# Patient Record
(demographics unavailable — no encounter records)

---

## 2017-10-08 NOTE — RADRPT
PROCEDURE:   CTA Chest. 

 

CLINICAL INDICATION:   chest pain  

 

TECHNIQUE:   The study was performed utilizing a  multidetector CT scanner. Direct spiral 1 mm axial
 sections were obtained from the thoracic inlet to the upper abdomen with the use of 100 cc of Omnip
aque 350 nonionic intravenous contrast material and reformatted at 3 mm. Coronal, sagittal and 3-D a
ngiographic reformations were obtained. The images were reviewed on a PACS workstation. 

 

CT D I 49 mCi

 

Dose 338 mGy/cm

 

Individualized dose optimization technique was used for the performance of this exam.

 

This included

1. Automated exposure control.

2. Adjustment of the mA and / or kV according to the patient's size.

3. Use of  iterative reconstruction technique.

 

COMPARISON:   No prior studies are available for comparison. 

 

FINDINGS:

There is no central or peripheral pulmonary embolism.  There is calcified plaque in the thoracic aor
ta. No aneurysm or dissection is present..  There is no hilar or mediastinal adenopathy or mass.  No
 pleural or pericardial effusion is visualized.  There is no pneumothorax. Atelectasis is seen in th
e inferior aspect of both lower lobes.

 

No upper abdominal or adrenal mass is present. Gallbladder has been removed and there is presumed ph
ysiologic mild intra and moderate extrahepatic bile duct dilatation.

 

The osseous structures appear normal.

 

IMPRESSION:

No pulmonary embolism.

 

No thoracic aortic aneurysm or dissection. Vascular calcifications.

 

No pneumonia. Post cholecystectomy with presumed physiologic mild intra and moderate extrahepatic bi
le duct dilatation.

_____________________________________________ 

.Jn Grove MD, MD           Date    Time 

Electronically viewed and signed by .Jn Grove MD, MD on 10/08/2017 15:38 

 

D:  10/08/2017 15:38  T:  10/08/2017 15:38

.A/

## 2017-10-08 NOTE — ERA
ER Documentation


Chief Complaint


Date/Time


DATE: 10/8/17 


TIME: 13:08


Chief Complaint


Palpitations, shortness of breath, chest pain 20 minutes ago





HPI


This is a 85-year-old female who states she was sitting and resting when she 

suddenly felt her heart racing with some shortness of breath with some mild 

chest pressure.  Her chest pressure does not radiate.  Patient states she has 

had this four times before.  She says she is not having any anxiety.  Her 

family states she has been to the ERs 3 other times for the same problem and 

give her a pill which relaxes her and they sent her home.  Patient has no 

pulmonary history denies any asthma COPD no recent cough





ROS


All systems reviewed and are negative except as per history of present illness.





Medications


Home Meds


Reported Medications


Simvastatin* (Zocor*) 10 Mg Tablet, 10 MG PO QHS, #30 TAB


   10/8/17


Thyroid* ([place] Thyroid*) 15 Mg Tab, 15 MG PO DAILY, TAB


   10/8/17


Furosemide* (Furosemide*) 40 Mg Tablet, 40 MG PO TID, TAB


   10/8/17


Hydralazine Hcl* (Hydralazine Hcl*) 25 Mg Tab, 25 MG PO BID, #120 TAB


   10/8/17


Lisinopril* (Lisinopril*) 40 Mg Tablet, 40 MG PO DAILY, #30 TAB


   10/8/17


Metoprolol Tartrate* (Lopressor*) 25 Mg Tab, 25 MG PO BID, #60 TAB


   10/8/17


Rivaroxaban* (Xarelto*) 15 Mg Tablet, 15 MG PO WITH DINNER, TAB


   10/8/17


Discontinued Reported Medications


[Same Meds]   No Conflict Check


   10/5/13


Simvastatin (Simvastatin) 10 Mg Tablet, DAILY


   10/5/13


Atenolol* (Atenolol*) 25 Mg Tablet, 25 MG PO DAILY


   3/19/13


Hydrochlorothiazide (Hydrochlorothiazide) 25 Mg Tablet, 25 MG PO DAILY


   3/19/13


Aspirin (Aspir-Low) 81 Mg Tablet.dr, 81 MG PO DAILY


   3/19/13





Allergies


Allergies:  


Coded Allergies:  


     No Known Allergy (Unverified , 10/8/17)





PMhx/Soc


History of Surgery:  Yes (GALLSTONES REMOVAL; EYE SURGERY)


Anesthesia Reaction:  No


Hx Neurological Disorder:  No


Hx Respiratory Disorders:  No


Hx Psychiatric Problems:  No


Hx Miscellaneous Medical Probl:  Yes (OSTEOPOSIS)


Hx Alcohol Use:  No


Hx Substance Use:  No


Hx Tobacco Use:  No





FmHx


Family History:  No coronary disease





Physical Exam


Vitals





Vital Signs








  Date Time  Temp Pulse Resp B/P Pulse Ox O2 Delivery O2 Flow Rate FiO2


 


10/8/17 13:34      Nasal Cannula 2 


 


10/8/17 12:42 98.3 114 20 162/97 97   








Physical Exam


Const:      Well-developed, well-nourished


 Head:        Atraumatic, normocephalic


 Eyes:       Normal Conjunctiva, PERRLA, EOMI, normal sclera, no nystagmus


 ENT:         Normal External Ears, Nose and Mouth, moist mucus membranes.


 Neck:        Full range of motion.  No meningismus, no lymphadenopathy.


 Resp:         Clear to auscultation bilaterally, no wheezing, rhonchi, rales, 

tachypnea


 Cardio:       Tachycardia, no murmurs, S1 S2 present]


 Abd:         Soft, non tender x 4, non distended. Normal bowel sounds, no 

guarding or rebound, no pulsitile abdominal masses or bruits


 Skin:         No petechiae or rashes, no ecchymosis , no maculopapular rash


 Back:        No midline or flank tenderness


 Ext:          No cyanosis, or edema, FROM x 4, normal inspection, 

neurovascularly intact x 4


 Neur:        Awake and alert, STR 5/5 x 4, sensation intact x 4, no focal 

findings, cerebellum intact


 Psych:        Normal Mood and Affect




















EKG: 


Rate/Rhythm:             Sinus tachycardia, slight ST depression in lead V4 5 

and


QRS, ST, QT:    NORMAL NC, QRS, QT]


Impression:      Abnormal ekg


Result Diagram:  


10/8/17 1315                                                                   

             10/8/17 1315





Results 24 hrs





 Laboratory Tests








Test


  10/8/17


13:15


 


White Blood Count 7.310^3/ul 


 


Red Blood Count 4.3210^6/ul 


 


Hemoglobin 12.2g/dl 


 


Hematocrit 38.3% 


 


Mean Corpuscular Volume 88.7fl 


 


Mean Corpuscular Hemoglobin 28.2pg 


 


Mean Corpuscular Hemoglobin


Concent 31.9g/dl 


 


 


Red Cell Distribution Width 14.8% 


 


Platelet Count 38525^3/UL 


 


Mean Platelet Volume 10.2fl 


 


Neutrophils % 61.9% 


 


Lymphocytes % 29.5% 


 


Monocytes % 6.8% 


 


Eosinophils % 0.7% 


 


Basophils % 0.7% 


 


Nucleated Red Blood Cells % 0.0/100WBC 


 


Neutrophils # 4.510^3/ul 


 


Lymphocytes # 2.210^3/ul 


 


Monocytes # 0.510^3/ul 


 


Eosinophils # 0.110^3/ul 


 


Basophils # 0.110^3/ul 


 


Nucleated Red Blood Cells # 0.010^3/ul 


 


Prothrombin Time 14.3Sec 


 


Prothrombin Time Ratio 1.1 


 


INR International Normalized


Ratio 1.11 


 


 


Activated Partial


Thromboplast Time 32.7Sec 


 


 


D-Dimer 577.46ng/ml 


 


D-Dimer Comment  


 


Sodium Level 140mmol/L 


 


Potassium Level 3.5mmol/L 


 


Chloride Level 102mmol/L 


 


Carbon Dioxide Level 25mmol/L 


 


Anion Gap 17 


 


Blood Urea Nitrogen 24mg/dl 


 


Creatinine 1.32mg/dl 


 


Glucose Level 118mg/dl 


 


Calcium Level 9.7mg/dl 


 


Troponin I 0.016ng/ml 


 


B-Type Natriuretic Peptide 777PG/ML 








 Current Medications








 Medications


  (Trade)  Dose


 Ordered  Sig/Zara


 Route


 PRN Reason  Start Time


 Stop Time Status Last Admin


Dose Admin


 


 Sodium Chloride


  (NS)  500 ml @ 


 500 mls/hr  Q1H STAT


 IV


   10/8/17 13:02


 10/8/17 14:01 DC 10/8/17 14:00


 


 


 Aspirin


  (Aspirin)  325 mg  ONCE  STAT


 PO


   10/8/17 13:02


 10/8/17 13:05 DC 10/8/17 13:59


 


 


 Morphine Sulfate


  (morphine)  2 mg  ONCE  STAT


 IV


   10/8/17 13:02


 10/8/17 13:05 DC  


 


 


 Ondansetron HCl


  (Zofran Inj)  4 mg  ONCE  STAT


 IV


   10/8/17 13:02


 10/8/17 13:05 DC  


 


 


 Enoxaparin Sodium


  (Lovenox)  70 mg  ONCE  STAT


 SC


   10/8/17 13:02


 10/8/17 13:05 DC 10/8/17 13:59


 


 


 Lorazepam


  (Ativan)  0.5 mg  ONCE  ONCE


 IV


   10/8/17 13:30


 10/8/17 13:31 DC 10/8/17 14:00


 


 


 IV Flush 10 ml  10 ml  STK-MED ONCE


 .ROUTE


   10/8/17 14:32


 10/8/17 14:33 DC  


 


 


 Sodium Chloride  100 ml @ ud  STK-MED ONCE


 .ROUTE


   10/8/17 14:32


 10/8/17 14:33 DC  


 


 


 Iohexol  100 ml @ ud  STK-MED ONCE


 .ROUTE


   10/8/17 14:32


 10/8/17 14:33 DC  


 


 


 Sodium Chloride


  (NS)  500 ml @ 


 500 mls/hr  Q1H STAT


 IV


   10/8/17 15:03


 10/8/17 16:02 DC 10/8/17 15:12


 











Procedures/MDM








PROCEDURE:   XR Chest. 


 


CLINICAL INDICATION:   Chest pain. 


 


TECHNIQUE:   Anterior chest x-ray. 


 


COMPARISON:   None. 


 


FINDINGS:


 


 there is consolidation in the left lung base with obscured left hemidiaphragm.


There is mild subsegmental atelectasis in the right lung base.


The upper lung zones are clear.


No pleural effusion identified.


There is no evidence of pneumothorax.


There is atherosclerotic calcification of the aorta. The cardiomediastinal 

silhouette is unremarkable.  


The soft tissues are normal.


Osseous structures are unremarkable.


 


IMPRESSION:      


 


1.  Dense consolidation in the left lung base. Differential diagnosis includes 

atelectasis, lobar pneumonia and pulmonary mass. Follow-up exam after treatment 

is suggested to assess for resolution.  


2.  Atherosclerotic calcification of the aorta.


 


RPTAT: QQ


_____________________________________________ 


.Robert Aparicio MD, MD           Date    Time 


Electronically viewed and signed by .Robert Aparicio MD, MD on 10/08/2017 14:

03 


 


D:  10/08/2017 14:03  T:  10/08/2017 14:03


.M/





CC: SANDRITA HAMMER DO























PROCEDURE:   CTA Chest. 


 


CLINICAL INDICATION:   chest pain  


 


TECHNIQUE:   The study was performed utilizing a  multidetector CT scanner. 

Direct spiral 1 mm axial sections were obtained from the thoracic inlet to the 

upper abdomen with the use of 100 cc of Omnipaque 350 nonionic intravenous 

contrast material and reformatted at 3 mm. Coronal, sagittal and 3-D 

angiographic reformations were obtained. The images were reviewed on a PACS 

workstation. 


 


CT D I 49 mCi


 


Dose 338 mGy/cm


 


Individualized dose optimization technique was used for the performance of this 

exam.


 


This included


1. Automated exposure control.


2. Adjustment of the mA and / or kV according to the patient's size.


3. Use of  iterative reconstruction technique.


 


COMPARISON:   No prior studies are available for comparison. 


 


FINDINGS:


There is no central or peripheral pulmonary embolism.  There is calcified 

plaque in the thoracic aorta. No aneurysm or dissection is present..  There is 

no hilar or mediastinal adenopathy or mass.  No pleural or pericardial effusion 

is visualized.  There is no pneumothorax. Atelectasis is seen in the inferior 

aspect of both lower lobes.


 


No upper abdominal or adrenal mass is present. Gallbladder has been removed and 

there is presumed physiologic mild intra and moderate extrahepatic bile duct 

dilatation.


 


The osseous structures appear normal.


 


IMPRESSION:


No pulmonary embolism.


 


No thoracic aortic aneurysm or dissection. Vascular calcifications.


 


No pneumonia. Post cholecystectomy with presumed physiologic mild intra and 

moderate extrahepatic bile duct dilatation.


_____________________________________________ 


.Jn Grove MD, MD           Date    Time 


Electronically viewed and signed by .Jn Grove MD, MD on 10/08/2017 15:

38 


 


D:  10/08/2017 15:38  T:  10/08/2017 15:38


.A/





CC: SANDRITA HAMMER DO

















EKG: 


Rate/Rhythm:             Sinus tachycardia with lateral ST depression


QRS, ST, QT:    NORMAL NC, QRS, QT]


Impression:      Abnormal EKG











Recheck at 1610: The patient is resting comfortably no chest pain no increased 

work of breathing heart rate is 81 on monitor normal sinus rhythm











Patient's symptoms are concerning for cardiac cause will require inpatient 

workup and continuous monitoring.  Further w/u for ischemia, arrhythmia, PE or 

dissection will be deferred to the inpatient team.





Accepting Care Team:


Current data and ongoing care discussed.


Time:                      Time of admission


Primary Provider:      [XOXOXO]


Consulting:                  [XOXOXO]


Outstanding Data:       none





Departure


Diagnosis:  


 Primary Impression:  


 Chest pain


 Qualified Code:  R07.9 - Chest pain, unspecified type


 Additional Impression:  


 Tachycardia


Condition:  Stable











SANDRITA HAMMER DO Oct 8, 2017 13:11

## 2017-10-08 NOTE — HP
Date/Time of Note


Date/Time of Note


DATE: 10/8/17 


TIME: 17:37





Assessment/Plan


VTE Prophylaxis


VTE Prophylaxis Intervention:  other (Factor Xa inhibitors.)





Assessment/Plan


Chief Complaint/Hosp Course


1.  Chest pressure with palpitations and dyspnea.  The patient will be ruled 

out for any underlying acute coronary syndrome.  Serial troponins will be 

obtained.  A 2D echocardiogram will be obtained.  Cardiology will evaluate the 

patient.





2.  History of cardiac arrhythmias.  Most probably atrial fibrillation based on 

the patient's explanation.  The patient will be continued on therapeutic 

anticoagulation with Xarelto.  The patient will be continued on beta-blockers.  

Cardiology will evaluate the patient.





3.  Essential hypertension.  The patient will be maintained on 

antihypertensives.  Antihypertensives will be adjusted to obtain optimal blood 

pressure control.





4.  Acute kidney injury.  Nonoliguric.  Unknown baseline creatinine.  

Nephrotoxic drugs will be held at this time.  The patient's renal function will 

be monitored closely.





5.  Hypothyroidism.  The patient's thyroid medications will be resumed.  A 

thyroid panel will be obtained.





6.  Dyslipidemia.  The patient will be continued on statins.  A fasting lipid 

panel will be obtained.





Plan: The patient will be admitted to inpatient telemetry floor. The patient 

will be started on a low-cholesterol diet. The patient will be started on DVT 

prophylaxis. The patient will remain a full code. Activities will be as 

tolerated.





The rest of the patient's management will be based on the clinical course, 

inputs from consultants, and the results of diagnostic studies.





Based on the patient's clinical presentation, she most probably requires at 

least one midnight's stay for further management and evaluation of her clinical 

presentation.





The case and management of this patient was fully discussed with Dr. Peters


Problems:  





HPI/ROS


Admit Date/Time


Admit Date/Time








Hx of Present Illness


Reason for admission: Palpitations, dyspnea, chest pain.





Consultants


1. Jamal Grimes MD, Cardiology.





This is an 84-year-old female with past medical history of essential 

hypertension, dyslipidemia, hypothyroidism, and cardiac arrhythmia possibly 

atrial fibrillation who is on Xarelto and status post cardiac ablation in the 

past who arrived to the emergency room with chief complaint of palpitations, 

dyspnea, and chest pain.  The patient's complaints were vague.  The patient 

verbalized the symptoms started spontaneously while she was sitting and resting 

when she felt palpitations and dyspnea with some chest pressure.  There was no 

reported radiation of chest pressure.  The patient reported nausea but no 

vomiting.  Patient denied any diaphoresis.  She denied any cough, fevers, or 

chills.  The patient/the patient's family verbalized prior similar 

presentations.  The patient takes Xarelto because irregular heartbeats and had 

a cardiac ablation approximately 3 years ago at some hospital in [facility].  The patient used to have a cardiologist as outpatient.  However, 

because of insurance reasons she is not following up with a cardiologist 

recently.





The patient's initial troponins were negative.  The patient's chest x-ray 

showed dense consolidation in the left lung base.  The patient also was noticed 

to have elevated d-dimer.  The patient consequently underwent a CT angiogram of 

the chest that was negative for any pulmonary embolism.  The CT was negative 

for any pneumonia.  The patient was treated with the single dose of aspirin, IV 

morphine, IV Ativan, and subcutaneous Lovenox.





ROS


Constitutional:  fatigue


Eyes:  no complaints


ENT:  no complaints


Respiratory:  shortness of breath (With excertion)


Cardiovascular:  palpitations


Gastrointestinal:  no complaints


Genitourinary:  no complaints


Musculoskeletal:  no complaints


Skin:  no complaints


Neurologic:  no complaints


Endocrine:  no complaints


Lymphatic:  no complaints


Psychological:  anxiety


Immunologic:  no complaints





PMH/Family/Social


Past Medical History


Medical History:  high cholesterol, hypertension, hypothyroid, other (A. fib)





Past Surgical History


Past Surgical Hx:  cholecystectomy, other (Cardiac ablation)





Social History


Lives at home with her family.


Alcohol Use:  none


Smoking Status:  Never smoker


Drug Use:  none





Exam/Review of Systems


Vital Signs


Vitals





 Vital Signs








  Date Time  Temp Pulse Resp B/P Pulse Ox O2 Delivery O2 Flow Rate FiO2


 


10/8/17 16:42  84 20 164/81 99 Room Air  


 


10/8/17 13:34       2 


 


10/8/17 12:42 98.3       











Exam


Exam


General: Adequately build 85 year-old female lying in bed in no apparent 

distress.


HEENT: Normocephalic, atraumatic. Eyes: Anicteric sclerae, conjunctivae clear. 

ENT: Nasal septum is midline, oral mucosa moist. Neck supple, minimal JVD 

noticed.


Respiratory: Bilaterally diminished breath sounds. No use of accessory muscles 

of respiration. No adventitious breath sounds.


Cardiovascular: S1, S2 heard. Regular rate and rhythm. 


Abdomen: Soft, nontender, and nondistended. Bowel sounds positive in all 4 

quadrants.


Genitourinary: Deferred.


Extremities: No cyanosis, no clubbing, no edema. Peripheral pulses palpable.


Neurologic: Cranial nerves II through XII grossly intact. The patient is awake, 

alert, and oriented.


Skin: Normal skin turgor. No skin rashes.





Labs


Result Diagram:  


10/8/17 1315                                                                   

             10/8/17 1315








Medications


Medications





 Current Medications


Lorazepam (Ativan) 0.5 mg Q8H  PRN PO ANXIETY;  Start 10/8/17 at 17:30;  Status 

UNV


Ondansetron HCl (Zofran Inj) 4 mg Q6H  PRN IV NAUSEA AND/OR VOMITING;  Start 10/

8/17 at 17:30;  Status UNV


Acetaminophen (Tylenol Tab) 650 mg Q6H  PRN PO PAIN LEVEL 1-3 OR FEVER;  Start 

10/8/17 at 17:30;  Status UNV


Acetaminophen/ Hydrocodone Bitart (Norco (5/325)) 1 tab Q6H  PRN PO PAIN LEVEL 4

-6;  Start 10/8/17 at 17:30;  Status UNV





Procedures


Procedures


CTA Thorax


IMPRESSION:


No pulmonary embolism.


No thoracic aortic aneurysm or dissection. Vascular calcifications.


No pneumonia. Post cholecystectomy with presumed physiologic mild intra and 

moderate extrahepatic bile duct dilatation.





CXR


IMPRESSION:      


1.  Dense consolidation in the left lung base. Differential diagnosis includes 

atelectasis, lobar pneumonia and pulmonary mass. Follow-up exam after treatment 

is suggested to assess for resolution.  


2.  Atherosclerotic calcification of the aorta.











JAGRUTI MOISE NP Oct 8, 2017 17:43





JAGRUTI MOISE NP Oct 8, 2017 17:43

## 2017-10-09 NOTE — DS
Date/Time of Note


Date/Time of Note


DATE: 10/9/17 


TIME: 12:35





Discharge Summary


Admission/Discharge Info


Admit Date/Time


Oct 8, 2017 at 16:34


Discharge Date/Time





Discharge Diagnosis


1.  Chest pressure with palpitations and dyspnea.


2.  History of cardiac arrhythmias. 


3.  Essential hypertension. 


4.  Acute kidney injury. 


5.  Hypothyroidism.  


6.  Dyslipidemia.


Patient Condition:  Stable


Consults


[facility]


This is an 84-year-old female with history of hypertension, dyslipidemia, 

hypothyroidism, cardiac arrhythmia, possible atrial ablation is currently on 

Xarelto, status post cardiac ablation in the past who came to [facility] due to reports of palpitations and dyspnea as well as 

chest pain.  Patient's complaints are noted to be vague.  We did do serial 

troponins which were all essentially negative.  Additionally since patient did 

have extensive cardiac history we did get cardiologist consultation.  Patient 

was likely possible transient A. fib.  No arrhythmias were noted on her 

telemetry monitoring.  We did continue her on her Xarelto as well as place her 

on metoprolol 25 mg twice a day per cardiology recommendations.  During the 

course states did improve.  Of note CTA of her chest was negative for any 

pulmonary embolism or pneumonia.  She is otherwise optimized medically.  She 

was resumed on antihypertensives for her hypertension.  She was seen with acute 

kidney injury and we did avoid nephrotoxic medications and she did improve.  

Renal function did improve prior to her discharge.  She was also continued on 

thyroid medication for her hypothyroidism and statin for dyslipidemia.  During 

the course of stay she did improve.  The plan of care was discussed with the 

patient and patient did verbalize her understanding.  On the day of discharge 

patient was in stable condition





Discussed plan of care with Dr. Maria


[facility]


Reported Medications


Simvastatin* (Zocor*) 10 Mg Tablet, 10 MG PO QHS, #30 TAB


   10/8/17


Thyroid* ([place] Thyroid*) 15 Mg Tab, 15 MG PO DAILY, TAB


   10/8/17


Furosemide* (Furosemide*) 40 Mg Tablet, 40 MG PO TID, TAB


   10/8/17


Hydralazine Hcl* (Hydralazine Hcl*) 25 Mg Tab, 25 MG PO BID, #120 TAB


   10/8/17


Lisinopril* (Lisinopril*) 40 Mg Tablet, 40 MG PO DAILY, #30 TAB


   10/8/17


Metoprolol Tartrate* (Lopressor*) 25 Mg Tab, 25 MG PO BID, #60 TAB


   10/8/17


Rivaroxaban* (Xarelto*) 15 Mg Tablet, 15 MG PO WITH DINNER, TAB


   10/8/17


Discontinued Reported Medications


[Same Meds]   No Conflict Check


   10/5/13


Simvastatin (Simvastatin) 10 Mg Tablet, DAILY


   10/5/13


Atenolol* (Atenolol*) 25 Mg Tablet, 25 MG PO DAILY


   3/19/13


Hydrochlorothiazide (Hydrochlorothiazide) 25 Mg Tablet, 25 MG PO DAILY


   3/19/13


Aspirin (Aspir-Low) 81 Mg Tablet.dr, 81 MG PO DAILY


   3/19/13


Follow-up Plan


Follow-up with your primary care provider within a week


Primary Care Provider


Not On Staff Doctor


Time spent on discharge:   > 30 minutes


Pending Labs





Laboratory Tests








Test


  10/8/17


13:15 10/8/17


21:10 10/9/17


08:40


 


White Blood Count


  7.310^3/ul


(4.8-10.8) 


  6.610^3/ul


(4.8-10.8)


 


Red Blood Count


  4.3210^6/ul


(4.20-5.40) 


  4.1610^6/ul


(4.20-5.40)


 


Hemoglobin


  12.2g/dl


(12.0-16.0) 


  11.7g/dl


(12.0-16.0)


 


Hematocrit


  38.3%


(37.0-47.0) 


  37.4%


(37.0-47.0)


 


Mean Corpuscular Volume


  88.7fl


(82.0-101.0) 


  89.9fl


(82.0-101.0)


 


Mean Corpuscular Hemoglobin


  28.2pg


(29.0-33.0) 


  28.1pg


(29.0-33.0)


 


Mean Corpuscular Hemoglobin


Concent 31.9g/dl


(32.0-37.0) 


  31.3g/dl


(32.0-37.0)


 


Red Cell Distribution Width


  14.8%


(11.5-14.5) 


  14.9%


(11.5-14.5)


 


Platelet Count


  26774^3/UL


(140-415) 


  99192^3/UL


(140-415)


 


Mean Platelet Volume


  10.2fl


(7.4-10.4) 


  10.3fl


(7.4-10.4)


 


Neutrophils %


  61.9%


(39.0-77.0) 


  57.4%


(39.0-77.0)


 


Lymphocytes %


  29.5%


(15.0-51.0) 


  33.0%


(15.0-51.0)


 


Monocytes %


  6.8%


(0.0-11.0) 


  6.7%


(0.0-11.0)


 


Eosinophils % 0.7% (0.0-7.0)   1.7% (0.0-7.0) 


 


Basophils % 0.7% (0.0-2.0)   0.6% (0.0-2.0) 


 


Nucleated Red Blood Cells %


  0.0/100WBC


(0.0-0.0) 


  0.0/100WBC


(0.0-0.0)


 


Neutrophils #


  4.510^3/ul


(1.6-7.5) 


  3.810^3/ul


(1.6-7.5)


 


Lymphocytes #


  2.210^3/ul


(0.8-2.9) 


  2.210^3/ul


(0.8-2.9)


 


Monocytes #


  0.510^3/ul


(0.3-0.9) 


  0.410^3/ul


(0.3-0.9)


 


Eosinophils #


  0.110^3/ul


(0.0-0.5) 


  0.110^3/ul


(0.0-0.5)


 


Basophils #


  0.110^3/ul


(0.0-0.1) 


  0.010^3/ul


(0.0-0.1)


 


Nucleated Red Blood Cells #


  0.010^3/ul


(0.0-0.0) 


  0.010^3/ul


(0.0-0.0)


 


Prothrombin Time


  14.3Sec


(12.2-14.2) 


  


 


 


Prothrombin Time Ratio 1.1   


 


INR International Normalized


Ratio 1.11 


  


  


 


 


Activated Partial


Thromboplast Time 32.7Sec


(25.0-35.0) 


  


 


 


D-Dimer


  577.46ng/ml


(<460) 


  


 


 


D-Dimer Comment    


 


Sodium Level


  140mmol/L


(135-144) 


  141mmol/L


(135-144)


 


Potassium Level


  3.5mmol/L


(3.5-5.1) 


  4.2mmol/L


(3.5-5.1)


 


Chloride Level


  102mmol/L


() 


  104mmol/L


()


 


Carbon Dioxide Level


  25mmol/L


(21-31) 


  28mmol/L


(21-31)


 


Anion Gap 17 (8-16)   13 (8-16) 


 


Blood Urea Nitrogen 24mg/dl (7-20)   21mg/dl (7-20) 


 


Creatinine


  1.32mg/dl


(0.44-1.00) 


  1.24mg/dl


(0.44-1.00)


 


Glucose Level


  118mg/dl


() 


  89mg/dl


()


 


Hemoglobin A1c 5.8% (0-5.9)   


 


Calcium Level


  9.7mg/dl


(8.4-10.2) 


  9.4mg/dl


(8.4-10.2)


 


Troponin I


  0.016ng/ml


(0.00-0.12) 0.069ng/ml


(0.00-0.12) 0.061ng/ml


(0.00-0.12)


 


B-Type Natriuretic Peptide


  777PG/ML


(0-450) 


  


 


 


Thyroid Stimulating Hormone


(TSH) 8.160MIU/L


(0.465-4.680) 


  


 


 


Free Thyroxine


  1.09ng/dl


(0.85-1.93) 


  


 


 


Creatine Kinase


  


  80IU/L


() 99IU/L


()


 


Creatine Kinase Index  1.6  1.3 


 


Creatinine Kinase MB (Mass)


  


  1.25ng/ml


(0.0-2.4) 1.26ng/ml


(0.0-2.4)


 


Phosphorus Level


  


  


  4.4mg/dl


(2.5-4.9)


 


Magnesium Level


  


  


  1.9mg/dl


(1.7-2.5)


 


Total Bilirubin


  


  


  0.7mg/dl


(0.2-1.3)


 


Direct Bilirubin


  


  


  0.00mg/dl


(0.00-0.20)


 


Indirect Bilirubin


  


  


  0.7mg/dl


(0-1.1)


 


Aspartate Amino Transf


(AST/SGOT) 


  


  36IU/L (15-46) 


 


 


Alanine Aminotransferase


(ALT/SGPT) 


  


  27IU/L (13-69) 


 


 


Alkaline Phosphatase


  


  


  99IU/L


()


 


Total Protein


  


  


  7.5g/dl


(6.1-8.1)


 


Albumin


  


  


  4.0g/dl


(3.3-4.9)


 


Globulin


  


  


  3.50g/dl


(1.3-3.2)


 


Albumin/Globulin Ratio   1.14 


 


Triglycerides Level


  


  


  139mg/dl


(0-149)


 


Cholesterol Level


  


  


  139mg/dl


(100-200)


 


LDL Cholesterol, Calculated   69mg/dl 


 


HDL Cholesterol


  


  


  42mg/dl


(33-92)


 


Cholesterol/HDL Ratio   3.3RATIO 

















DONNY BAEZ Oct 9, 2017 12:38

## 2017-10-09 NOTE — CONS
Date/Time of Note


Date/Time of Note


DATE: 10/9/17 


TIME: 11:24





Assessment/Plan


Assessment/Plan


Chief Complaint/Hosp Course


Palpitations: Transient and possibly had recurrence of her ?afib/flutter. No 

arrhythmias on presentation or on tele.  May need monitor as outpt to evaluate. 


?Paroxysmal afib/flutter: s/p ablation. Possible recurrence as above. On 

Xarelto.


HTN


CKD





-ok for d/c 


-increase home metoprolol to 25mg BID


-continue Xarelto


-f/u with outpt cardiologist


Problems:  





Consultation Date/Type/Reason


Admit Date/Time





Date of Consultation:  Oct 9, 2017


Type of Consultation:  Cardiology


Reason for Consultation


Palpitations


Referring Provider:  JAGRUTI MOISE NP





Hx of Present Illness


86 yo F with a h/o cardiac arrhythmia (presumably afib or flutter) s/p ablation 

3 yrs ago on Xarelto, HTN, CKD, who presented with palpitations and dizziness. 

An  was used. She notes that she had sudden onset palpitations and a 

feeling of "a bubble in her head". This was identical to her episodes prior to 

ablation. She has sinus tachycardia on admission but no arrhythmias. She feels 

well now. No chest pain prior or currently. She has an outpt cardiologist who 

she last saw 3 months ago. She would like to go home.


per hPI


Eyes:  no complaints


ENT:  no complaints


Respiratory:  shortness of breath (With excertion)


Cardiovascular:  palpitations


Gastrointestinal:  no complaints


Genitourinary:  no complaints


Musculoskeletal:  no complaints


Skin:  no complaints


Neurologic:  no complaints


Lymphatic:  no complaints


Psychological:  anxiety


Immunologic:  no complaints





Past Medical History


per hPI


Medical History:  high cholesterol, hypertension, hypothyroid, other (A. fib)





Past Surgical History


Past Surgical Hx:  cholecystectomy, other (Cardiac ablation)





Social History


Alcohol Use:  none


Smoking Status:  Never smoker


Drug Use:  none





Exam/Review of Systems


Vital Signs


Vitals





 Vital Signs








  Date Time  Temp Pulse Resp B/P Pulse Ox O2 Delivery O2 Flow Rate FiO2


 


10/9/17 08:02  70      


 


10/9/17 07:52 98.4  18 138/60 96   


 


10/8/17 21:25      Room Air  


 


10/8/17 13:34       2 














 Intake and Output   


 


 10/8/17 10/8/17 10/9/17





 15:00 23:00 07:00


 


Intake Total   700 ml


 


Output Total   650 ml


 


Balance   50 ml











Exam


Constitutional:  alert, oriented


Psych:  nl mood/affect, no complaints


Head:  atraumatic, normocephalic


Eyes:  nl conjunctiva


ENMT:  nl external ears & nose


Neck:  supple, 


   No jvd


Respiratory:  clear to auscultation, 


   No crackles/rales


Cardiovascular:  regular rate and rhythm, 


   No edema, No systolic murmur


Gastrointestinal:  non-tender, soft, 


   No distended


Musculoskeletal:  nl extremities to inspection


Extremities:  normal pulses


Neurological:  nl mental status, nl speech


Skin:  No rash or lesions





Results


EKG: sinus tachycardia, nonspecific TW changes





Tele: no arrhythmias.


Result Diagram:  


10/9/17 0840                                                                   

             10/9/17 0840





Results 24 hrs





Laboratory Tests








Test


  10/8/17


13:15 10/8/17


21:10 10/9/17


08:40


 


White Blood Count 7.3    6.6  


 


Red Blood Count 4.32    4.16  L


 


Hemoglobin 12.2    11.7  L


 


Hematocrit 38.3    37.4  


 


Mean Corpuscular Volume 88.7    89.9  


 


Mean Corpuscular Hemoglobin 28.2  L  28.1  L


 


Mean Corpuscular Hemoglobin


Concent 31.9  L


  


  31.3  L


 


 


Red Cell Distribution Width 14.8  H  14.9  H


 


Platelet Count 270    265  


 


Mean Platelet Volume 10.2    10.3  


 


Neutrophils % 61.9    57.4  


 


Lymphocytes % 29.5    33.0  


 


Monocytes % 6.8    6.7  


 


Eosinophils % 0.7    1.7  


 


Basophils % 0.7    0.6  


 


Nucleated Red Blood Cells % 0.0    0.0  


 


Neutrophils # 4.5    3.8  


 


Lymphocytes # 2.2    2.2  


 


Monocytes # 0.5    0.4  


 


Eosinophils # 0.1    0.1  


 


Basophils # 0.1    0.0  


 


Nucleated Red Blood Cells # 0.0    0.0  


 


Prothrombin Time 14.3  H  


 


Prothrombin Time Ratio 1.1    


 


INR International Normalized


Ratio 1.11  


  


  


 


 


Activated Partial


Thromboplast Time 32.7  


  


  


 


 


D-Dimer 577.46  H  


 


D-Dimer Comment     


 


Sodium Level 140    141  


 


Potassium Level 3.5    4.2  


 


Chloride Level 102    104  


 


Carbon Dioxide Level 25    28  


 


Anion Gap 17  H  13  


 


Blood Urea Nitrogen 24  H  21  H


 


Creatinine 1.32  H  1.24  H


 


Glucose Level 118    89  


 


Hemoglobin A1c 5.8    


 


Calcium Level 9.7    9.4  


 


Troponin I 0.016   0.069   0.061  


 


B-Type Natriuretic Peptide 777  H  


 


Thyroid Stimulating Hormone


(TSH) 8.160  H


  


  


 


 


Free Thyroxine 1.09    


 


Creatine Kinase  80   99  


 


Creatine Kinase Index  1.6   1.3  


 


Creatinine Kinase MB (Mass)  1.25   1.26  


 


Phosphorus Level   4.4  


 


Magnesium Level   1.9  


 


Total Bilirubin   0.7  


 


Direct Bilirubin   0.00  


 


Indirect Bilirubin   0.7  


 


Aspartate Amino Transf


(AST/SGOT) 


  


  36  


 


 


Alanine Aminotransferase


(ALT/SGPT) 


  


  27  


 


 


Alkaline Phosphatase   99  


 


Total Protein   7.5  


 


Albumin   4.0  


 


Globulin   3.50  H


 


Albumin/Globulin Ratio   1.14  


 


Triglycerides Level   139  


 


Cholesterol Level   139  


 


LDL Cholesterol, Calculated   69  


 


HDL Cholesterol   42  


 


Cholesterol/HDL Ratio   3.3  











Medications


Medications





 Current Medications


Lorazepam (Ativan) 0.5 mg Q8H  PRN PO ANXIETY;  Start 10/8/17 at 17:30


Ondansetron HCl (Zofran Inj) 4 mg Q6H  PRN IV NAUSEA AND/OR VOMITING;  Start 10/

8/17 at 17:30


Acetaminophen (Tylenol Tab) 650 mg Q6H  PRN PO PAIN LEVEL 1-3 OR FEVER;  Start 

10/8/17 at 17:30


Acetaminophen/ Hydrocodone Bitart (Norco (5/325)) 1 tab Q6H  PRN PO PAIN LEVEL 4

-6;  Start 10/8/17 at 17:30


Hydralazine HCl (Apresoline) 25 mg BID PO  Last administered on 10/9/17at 08:21

; Admin Dose 25 MG;  Start 10/8/17 at 21:00


Metoprolol Tartrate (Lopressor) 25 mg BID PO  Last administered on 10/9/17at 08:

21; Admin Dose 25 MG;  Start 10/8/17 at 21:00


Thyroid ([place] Thyroid) 15 mg DAILY PO ;  Start 10/9/17 at 09:00


Atorvastatin Calcium 10 mg 10 mg DAILY@21 PO  Last administered on 10/8/17at 22:

22; Admin Dose 10 MG;  Start 10/8/17 at 21:00


Sodium Chloride (NS) 1,000 ml @  60 mls/hr [id number] IV  Last administered on 10/9

/17at 10:46; Admin Dose 60 MLS/HR;  Start 10/9/17 at 10:30











GEOFFREY CARROLL Oct 9, 2017 11:30

## 2017-10-09 NOTE — PDOCDIS
Discharge Instructions


DIAGNOSIS


Discharge Diagnosis


1.  Chest pressure with palpitations and dyspnea.


2.  History of cardiac arrhythmias. 


3.  Essential hypertension. 


4.  Acute kidney injury. 


5.  Hypothyroidism.  


6.  Dyslipidemia.





CONDITION


Patient Condition:  Stable





HOME CARE INSTRUCTIONS:


Diet Instructions:  Low Fat /Cholesterol





FOLLOW UP/APPOINTMENTS


Follow-up Plan


Follow-up with your primary care provider within a week











DONNY BAEZ Oct 9, 2017 11:02

## 2017-10-10 NOTE — RADRPT
Echocardiogram Report

 

Patient Name:  CEE CAMACHO  Gender:       Female

MRN:           [id number]         Accession #:  [id number]

Birth Date:    1932     Study Date:   09-Oct-2017

Sonographer:   [facility]        Location:     [id number]

 

Ref. Physician: JAGRUTI MOISE

Quality: Good

 

Procedures: Transthoracic echocardiogram with complete 2D, M-Mode, and 

doppler examination.

Indications: Chest Pain.

 

2D/M Mode                          Doppler

Measurement  Value  Normal Ranges  Measurement    Value  Normal Ranges

AoR Diam MM  3.4    cm             MATTHEW Vmax       2.0    cm2

LA/Ao MM     1.3                   AV Peak Leon    1.2    m/sec

LA Dimen MM  4.5    cm             AV Peak PG     6.0    mmHg

LVIDd 2D     3.9    3.5 - 5.6 cm   LVOT Peak Leon  0.9    m/sec

LVIDs 2D     2.4    2.1 - 4.1 cm   LVOT Peak PG   3.0    mmHg

FS 2D        37.9   %              MV E Peak Leon  0.5    m/sec

LVPWd 2D     1.2    0.6 - 1.1 cm   MV A Peak Leon  0.6    m/sec

IVSd 2D      1.2    0.6 - 1.1 cm   MV E/A         0.9

IVS/LVPW 2D  1.0                   MV Decel Time  173    msec

EDV 2D       60.7   cm3            MV E/A         0.9

ESV 2D       14.5   cm3            MR Peak PG     111.0  mmHg

LVOT Diam    1.8    cm             MR Peak Leon    5.3    m/sec

LVOT Area    2.5    cm2            TR Peak Leon    3.5    m/sec

TR Peak PG     49.0   mmHg

RVSP           52.0   mmHg

 

Findings

Left Ventricle: Normal left ventricular systolic function.  Normal left 

ventricular cavity size.  Moderate concentric left ventricular 

hypertrophy.  Ejection fraction is visually estimated at 60 %.  Tissue 

Doppler/Mitral Doppler indices are consistent with impaired relaxation 

(Stage I diastolic dysfunction).

Right Ventricle: Normal right ventricular size.  Normal right 

ventricular systolic function.

Left Atrium: There is severe enlargement of left atrium.

Right Atrium: There is moderate enlargement of right atrium.

Mitral Valve: Mild to moderate mitral valve regurgitation.

Aortic Valve: Normal appearance of the aortic valve.  No significant 

aortic stenosis or insufficiency.  Mild aortic valve regurgitation.

Tricuspid Valve: Normal appearance of the tricuspid valve.  Estimated 

peak PA systolic pressure 52 mmHg.  There is mild to moderate tricuspid 

regurgitation.

Pulmonic Valve: Normal pulmonic valve appearance.  There is trace 

pulmonic regurgitation.

Pericardium: Normal pericardium with no significant pericardial effusion.

Aorta: Normal aortic root.

IVC: Normal size and normal respiratory collapse consistent with normal 

right atrial pressure.

 

Conclusions

Normal left ventricular systolic function.  Normal left ventricular 

cavity size.  Moderate concentric left ventricular hypertrophy.  

Ejection fraction is visually estimated at 60 %.  Tissue Doppler/Mitral 

Doppler indices are consistent with impaired relaxation (Stage I 

diastolic dysfunction).

Mild to moderate mitral valve regurgitation.

Mild aortic valve regurgitation.

Mild to moderate tricuspid regurgitation.

Estimated peak PA systolic pressure 52 mmHg based on RA pressure of 3 

mmHg.

 

Electronically Signed By:

Jamal Grimes

10-Oct-2017 00:28:18  -0700

 

Patient Name: CEE CAMACHO

MRN: [id number]

Study Date: 09-Oct-2017

 

[id number]

## 2019-12-12 NOTE — NUR
BIBF C/O "FOREHEAD LAC BECAUSE TRIP AND FALL AROUND X1 HR AGO, WAS DANCING AND 
TRIPPED" -LOC. AOX4. AMBULATORY. VSS AT THIS TIME. -ACUTE DISTRESS NOTED

## 2025-06-12 NOTE — RADRPT
PROCEDURE:   XR Chest. 

 

CLINICAL INDICATION:   Chest pain. 

 

TECHNIQUE:   Anterior chest x-ray. 

 

COMPARISON:   None. 

 

FINDINGS:

 

 there is consolidation in the left lung base with obscured left hemidiaphragm.

There is mild subsegmental atelectasis in the right lung base.

The upper lung zones are clear.

No pleural effusion identified.

There is no evidence of pneumothorax.

There is atherosclerotic calcification of the aorta. The cardiomediastinal silhouette is unremarkabl
e.  

The soft tissues are normal.

Osseous structures are unremarkable.

 

IMPRESSION:      

 

1.  Dense consolidation in the left lung base. Differential diagnosis includes atelectasis, lobar pn
eumonia and pulmonary mass. Follow-up exam after treatment is suggested to assess for resolution.  

2.  Atherosclerotic calcification of the aorta.

 

RPTAT: QQ

_____________________________________________ 

.Robert Aparicio MD, MD           Date    Time 

Electronically viewed and signed by .Robert Aparicio MD, MD on 10/08/2017 14:03 

 

D:  10/08/2017 14:03  T:  10/08/2017 14:03

.M/ HEMATURIA Average